# Patient Record
Sex: FEMALE | Race: WHITE
[De-identification: names, ages, dates, MRNs, and addresses within clinical notes are randomized per-mention and may not be internally consistent; named-entity substitution may affect disease eponyms.]

---

## 2019-08-28 ENCOUNTER — HOSPITAL ENCOUNTER (OUTPATIENT)
Dept: HOSPITAL 95 - LAB SHORT | Age: 59
Discharge: HOME | End: 2019-08-28
Attending: OBSTETRICS & GYNECOLOGY
Payer: COMMERCIAL

## 2019-08-28 DIAGNOSIS — Z12.4: Primary | ICD-10-CM

## 2019-08-28 DIAGNOSIS — N85.00: ICD-10-CM

## 2019-08-28 PROCEDURE — G0123 SCREEN CERV/VAG THIN LAYER: HCPCS

## 2019-08-30 LAB — OTHER STN SPEC: (no result)

## 2019-09-20 ENCOUNTER — HOSPITAL ENCOUNTER (INPATIENT)
Dept: HOSPITAL 95 - PRE IP | Age: 59
LOS: 6 days | Discharge: HOME | DRG: 743 | End: 2019-09-26
Attending: OBSTETRICS & GYNECOLOGY | Admitting: OBSTETRICS & GYNECOLOGY
Payer: COMMERCIAL

## 2019-09-20 VITALS — WEIGHT: 117.29 LBS | BODY MASS INDEX: 21.31 KG/M2 | HEIGHT: 62.01 IN

## 2019-09-20 DIAGNOSIS — D25.9: Primary | ICD-10-CM

## 2019-09-20 DIAGNOSIS — N93.9: ICD-10-CM

## 2019-09-25 PROCEDURE — 0UT90ZZ RESECTION OF UTERUS, OPEN APPROACH: ICD-10-PCS | Performed by: OBSTETRICS & GYNECOLOGY

## 2019-09-25 PROCEDURE — 0UB90ZZ EXCISION OF UTERUS, OPEN APPROACH: ICD-10-PCS | Performed by: OBSTETRICS & GYNECOLOGY

## 2019-09-26 LAB
BASOPHILS # BLD AUTO: 0.03 K/MM3 (ref 0–0.23)
BASOPHILS NFR BLD AUTO: 0 % (ref 0–2)
DEPRECATED RDW RBC AUTO: 44 FL (ref 35.1–46.3)
EOSINOPHIL # BLD AUTO: 0.09 K/MM3 (ref 0–0.68)
EOSINOPHIL NFR BLD AUTO: 1 % (ref 0–6)
ERYTHROCYTE [DISTWIDTH] IN BLOOD BY AUTOMATED COUNT: 12.7 % (ref 11.7–14.2)
HCT VFR BLD AUTO: 36.2 % (ref 33–51)
HGB BLD-MCNC: 11.4 G/DL (ref 11.5–16)
IMM GRANULOCYTES # BLD AUTO: 0.04 K/MM3 (ref 0–0.1)
IMM GRANULOCYTES NFR BLD AUTO: 0 % (ref 0–1)
LYMPHOCYTES # BLD AUTO: 2.06 K/MM3 (ref 0.84–5.2)
LYMPHOCYTES NFR BLD AUTO: 15 % (ref 21–46)
MCHC RBC AUTO-ENTMCNC: 31.5 G/DL (ref 31.5–36.5)
MCV RBC AUTO: 95 FL (ref 80–100)
MONOCYTES # BLD AUTO: 1.1 K/MM3 (ref 0.16–1.47)
MONOCYTES NFR BLD AUTO: 8 % (ref 4–13)
NEUTROPHILS # BLD AUTO: 10.82 K/MM3 (ref 1.96–9.15)
NEUTROPHILS NFR BLD AUTO: 77 % (ref 41–73)
NRBC # BLD AUTO: 0 K/MM3 (ref 0–0.02)
NRBC BLD AUTO-RTO: 0 /100 WBC (ref 0–0.2)
PLATELET # BLD AUTO: 235 K/MM3 (ref 150–400)

## 2019-12-19 ENCOUNTER — HOSPITAL ENCOUNTER (OUTPATIENT)
Dept: HOSPITAL 95 - ORSCSDS | Age: 59
Discharge: HOME | End: 2019-12-19
Attending: SURGERY
Payer: COMMERCIAL

## 2019-12-19 VITALS — BODY MASS INDEX: 20.51 KG/M2 | HEIGHT: 62.01 IN | WEIGHT: 112.88 LBS

## 2019-12-19 DIAGNOSIS — Z80.0: ICD-10-CM

## 2019-12-19 DIAGNOSIS — R19.7: ICD-10-CM

## 2019-12-19 DIAGNOSIS — Z86.010: ICD-10-CM

## 2019-12-19 DIAGNOSIS — R19.4: Primary | ICD-10-CM

## 2019-12-19 DIAGNOSIS — F17.210: ICD-10-CM

## 2019-12-19 PROCEDURE — 0DBE8ZX EXCISION OF LARGE INTESTINE, VIA NATURAL OR ARTIFICIAL OPENING ENDOSCOPIC, DIAGNOSTIC: ICD-10-PCS | Performed by: SURGERY

## 2025-01-22 NOTE — NUR
Patient up to Ambulate independently. Gait steady.
Discharge instructions reviewed with patient. Patient verbalizes understanding.
Copy given to patient to take home,AS WELL AS FRIEND.
Patient States Post-Procedure ride home has been arranged.
Discharged via wheelchair to private car for ride home.
PT TOLERATING PO. REPORTS READY TO GO HOME. PT PPP,CIRC CHECK WNL. PT ASSISTED
BY RN AND FEMALE RN TO GET DRESSED,POLAR PAC AND IMMOBILIZER IN PLACE. FRIEND
AT BEDSIDE WHILE PT ASSISTED WITH DRESSING,POLAR PAC, AND IMMOBILIZER.
PT/FRIEND EDUCATED ON IMMOBILIZER AND POLAR PAC.
DRESSING REMAINS C/D/I.